# Patient Record
Sex: MALE | Race: WHITE | NOT HISPANIC OR LATINO | Employment: UNEMPLOYED | URBAN - METROPOLITAN AREA
[De-identification: names, ages, dates, MRNs, and addresses within clinical notes are randomized per-mention and may not be internally consistent; named-entity substitution may affect disease eponyms.]

---

## 2020-05-27 ENCOUNTER — TELEPHONE (OUTPATIENT)
Dept: PSYCHIATRY | Facility: CLINIC | Age: 45
End: 2020-05-27

## 2020-05-27 NOTE — TELEPHONE ENCOUNTER
----- Message from Alida Dejesus sent at 5/27/2020  6:19 AM CDT -----  Contact: pt portal request   Appointment Request From: Lamine Pardo    With Provider: Francisco Mendes    Preferred Date Range: 6/10/2020 - 6/12/2020    Preferred Times: Any time    Reason for visit: Counseling    Comments:  COVID-19 pandemic has shook up. Management of PTSD, severe depression, very bad temper is worsening, laid off, overall stress is too toxic

## 2021-04-28 ENCOUNTER — PATIENT MESSAGE (OUTPATIENT)
Dept: RESEARCH | Facility: HOSPITAL | Age: 46
End: 2021-04-28

## 2023-05-05 ENCOUNTER — HOSPITAL ENCOUNTER (EMERGENCY)
Facility: HOSPITAL | Age: 48
Discharge: HOME | End: 2023-05-05
Attending: STUDENT IN AN ORGANIZED HEALTH CARE EDUCATION/TRAINING PROGRAM
Payer: MEDICAID

## 2023-05-05 VITALS
RESPIRATION RATE: 18 BRPM | HEIGHT: 74 IN | OXYGEN SATURATION: 99 % | DIASTOLIC BLOOD PRESSURE: 91 MMHG | WEIGHT: 250 LBS | SYSTOLIC BLOOD PRESSURE: 138 MMHG | HEART RATE: 78 BPM | BODY MASS INDEX: 32.08 KG/M2 | TEMPERATURE: 98 F

## 2023-05-05 DIAGNOSIS — Z86.79 HX OF PRIMARY HYPERTENSION: ICD-10-CM

## 2023-05-05 DIAGNOSIS — E03.9 HYPOTHYROIDISM, UNSPECIFIED TYPE: ICD-10-CM

## 2023-05-05 DIAGNOSIS — Z76.0 MEDICATION REFILL: ICD-10-CM

## 2023-05-05 DIAGNOSIS — F32.A CHRONIC DEPRESSION: ICD-10-CM

## 2023-05-05 DIAGNOSIS — Z13.9 ENCOUNTER FOR SCREENING INVOLVING SOCIAL DETERMINANTS OF HEALTH (SDOH): Primary | ICD-10-CM

## 2023-05-05 PROCEDURE — 99282 EMERGENCY DEPT VISIT SF MDM: CPT

## 2023-05-05 RX ORDER — LEVOTHYROXINE SODIUM 50 UG/1
50 TABLET ORAL DAILY
Qty: 30 TABLET | Refills: 0 | Status: SHIPPED | OUTPATIENT
Start: 2023-05-05

## 2023-05-05 RX ORDER — DULOXETIN HYDROCHLORIDE 60 MG/1
60 CAPSULE, DELAYED RELEASE ORAL DAILY
COMMUNITY

## 2023-05-05 RX ORDER — AMLODIPINE BESYLATE 5 MG/1
5 TABLET ORAL DAILY
Qty: 30 TABLET | Refills: 0 | Status: SHIPPED | OUTPATIENT
Start: 2023-05-05 | End: 2023-06-04

## 2023-05-05 RX ORDER — ACETAMINOPHEN 325 MG/1
650 TABLET ORAL
Status: DISCONTINUED | OUTPATIENT
Start: 2023-05-05 | End: 2023-05-05 | Stop reason: HOSPADM

## 2023-05-05 NOTE — ED PROVIDER NOTES
"Encounter Date: 5/5/2023       History     Chief Complaint   Patient presents with    Psychiatric Evaluation     Pt to er c/o having headache and has been out of his anit-depression meds. Pt repeatly says its "too much". Pt denies S.I or H.I.     HPI    48-year-old male with a past medical history of hypertension, hypothyroidism and depression presents emergency department for wanting to restart his medications.  Patient states he has not had his medications for last few years.  States that he got divorce with his wife and then started taking drugs.  Currently living in sober living.  States that he wants to get back on his medications.  Denies any SI or HI.  States she just wants some referrals and refills.    Review of patient's allergies indicates:  No Known Allergies  Past Medical History:   Diagnosis Date    Diabetes mellitus     Thyroid disease      No past surgical history on file.  No family history on file.  Social History     Tobacco Use    Smoking status: Every Day     Packs/day: 1.00     Types: Cigarettes    Smokeless tobacco: Never   Substance Use Topics    Alcohol use: No    Drug use: No     Review of Systems   Constitutional:  Negative for fever.   Respiratory:  Negative for cough and shortness of breath.    Cardiovascular:  Negative for chest pain.   Gastrointestinal:  Negative for abdominal pain.   Psychiatric/Behavioral:  Positive for dysphoric mood and sleep disturbance. Negative for hallucinations and suicidal ideas.    All other systems reviewed and are negative.    Physical Exam     Initial Vitals [05/05/23 1206]   BP Pulse Resp Temp SpO2   (!) 144/100 83 20 97.7 °F (36.5 °C) 98 %      MAP       --         Physical Exam    Nursing note and vitals reviewed.  Constitutional: He appears well-developed and well-nourished. No distress.   Cardiovascular:  Normal rate and regular rhythm.           Pulmonary/Chest: Breath sounds normal. No respiratory distress.   Abdominal: Abdomen is soft. There is no " abdominal tenderness.   Musculoskeletal:         General: No tenderness. Normal range of motion.     Neurological: He is alert and oriented to person, place, and time.   Skin: Skin is warm. Capillary refill takes less than 2 seconds.   Psychiatric: His speech is normal and behavior is normal. Judgment and thought content normal. He exhibits a depressed mood.       ED Course   Procedures  Labs Reviewed - No data to display       Imaging Results    None          Medications - No data to display  Medical Decision Making:   Differential Diagnosis:   Chronic depression, hypertension, hypothyroidism, social determininants of health, medication refill  ED Management:  Patient is simply here because he does not have any PCP.  States he tried to make an appointment with a doctor but was unable to find 1.  States he just wants to get back on his medication.  Want to find out resources for mental health.  Will give him a sandwich.  I secured him a PCP appointment in a month and he will go [facility] on Monday.  Patient with no SI or HI.   Medical Decision Making  Problems Addressed:  Chronic depression: chronic illness or injury  Encounter for screening involving social determinants of health (SDoH): acute illness or injury  Hx of primary hypertension: chronic illness or injury  Hypothyroidism, unspecified type: chronic illness or injury  Medication refill: self-limited or minor problem    Amount and/or Complexity of Data Reviewed  External Data Reviewed: notes.    Risk  OTC drugs.  Prescription drug management.  Diagnosis or treatment significantly limited by social determinants of health.                           Clinical Impression:   Final diagnoses:  [Z13.9] Encounter for screening involving social determinants of health (SDoH) (Primary)  [F32.A] Chronic depression  [Z86.79] Hx of primary hypertension  [E03.9] Hypothyroidism, unspecified type  [Z76.0] Medication refill        ED Disposition Condition     Discharge Stable          ED Prescriptions       Medication Sig Dispense Start Date End Date Auth. Provider    levothyroxine (SYNTHROID) 50 MCG tablet Take 1 tablet (50 mcg total) by mouth once daily. 30 tablet 5/5/2023 -- Delfino Tristan MD    amLODIPine (NORVASC) 5 MG tablet Take 1 tablet (5 mg total) by mouth once daily. 30 tablet 5/5/2023 6/4/2023 Delfino Tristan MD          Follow-up Information       Follow up With Specialties Details Why Contact Info    Gabriela Abad NP Nurse Practitioner Go on 6/15/2023 at 730 AM [address] [facility] [address]  [phone]      [facility] - Emergency Dept Emergency Medicine Go to  If symptoms worsen [address]  [facility] [id number] [phone]    [facility]  Go to   [address] [facility] [address]  [phone]             Delfino Tristan MD  05/05/23 8386

## 2023-05-05 NOTE — ED TRIAGE NOTES
"Pt to er c/o having headache and has been out of his anit-depression meds. Pt repeatly says its "too much". Pt denies S.I or H.I.  "

## 2025-04-26 ENCOUNTER — HOSPITAL ENCOUNTER (EMERGENCY)
Facility: HOSPITAL | Age: 50
Discharge: HOME | End: 2025-04-26
Attending: EMERGENCY MEDICINE

## 2025-04-26 VITALS
OXYGEN SATURATION: 97 % | HEART RATE: 97 BPM | RESPIRATION RATE: 17 BRPM | SYSTOLIC BLOOD PRESSURE: 148 MMHG | DIASTOLIC BLOOD PRESSURE: 86 MMHG | TEMPERATURE: 98 F | HEIGHT: 74 IN | WEIGHT: 218 LBS | BODY MASS INDEX: 27.98 KG/M2
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
## 2025-04-26 PROCEDURE — 99282 EMERGENCY DEPT VISIT SF MDM: CPT

## 2025-04-26 NOTE — DISCHARGE INSTRUCTIONS
No sign of significant injury.      Rest, lie down flat on your back for as much as possible of the rest of today.      Follow-up with a urologist if any residual concerns.

## 2025-04-26 NOTE — ED PROVIDER NOTES
"Encounter Date: 4/26/2025       History     Chief Complaint   Patient presents with    foreign object on penis     States "I have a sex toy stuck on my penis".  States penile and testicular swelling as a result of ring being on penis since midnight.  Went to [employer] this am and purchased bolt cutters in an attempt to remove self.       Metallic ring being used as a sex toy around his penile shaft and scrotum stuck in place for the last several hours, unable to remove, starting to hurt.  No other injury or complaint.  Nursing staff begin the process of carefully cutting the ring in 2 locations for removal shortly after arrival using a specially designed and relatively new specific ring cutting power tool.    The history is provided by the patient. No  was used.     Review of patient's allergies indicates:  No Known Allergies  Past Medical History:   Diagnosis Date    Diabetes mellitus     Thyroid disease      History reviewed. No pertinent surgical history.  No family history on file.  Social History[1]  Review of Systems   Genitourinary:         See HPI       Physical Exam     Initial Vitals [04/26/25 0659]   BP Pulse Resp Temp SpO2   (!) 150/108 97 17 98.2 °F (36.8 °C) 99 %      MAP       --         Physical Exam    Nursing note and vitals reviewed.  Constitutional: He appears well-developed and well-nourished. He appears distressed.   Pulmonary/Chest: No respiratory distress.   Genitourinary:    Genitourinary Comments: Metallic ring in place circling the base of the scrotum and phallus, approximately 3 8th of an inch in diameter, smooth, appears to be ordinary steel, responding to initial attempts for cutting for removal.  No maria luisa necrosis of tissues noted.  Circumcised, mild edema distal to the ring as would be expected, nothing severe.       Neurological: He is alert and oriented to person, place, and time.         ED Course   Procedures  Labs Reviewed - No data to display         7:39 AM " Successful complete removal of the ring, all portions of the metallic ring accounted for, examined several minutes after final removal.  Resting comfortably, normal perfusion throughout, minimal residual edema of the proximal aspect of the phallus and scrotum, very minimal contusion noted about the base of the scrotum, no sign of injury to Internal scrotal contents, no sign of vascular or structural injury.  Counseled, reassured.  No further specific treatment needed, ready for discharge with the following instructions:      No sign of significant injury.      Rest, lie down flat on your back for as much as possible of the rest of today.      Follow-up with a urologist if any residual concerns.          Imaging Results    None          Medications - No data to display  Medical Decision Making  Unable to remove a metallic ring around the base of the penis and scrotum, successful removal with specifically designed powered ring  the ER with excellent results, no sign residual entry or tissue compromise.    Problems Addressed:  External constriction caused by other object: acute illness or injury      Additional MDM:   Differential Diagnosis:   Extrinsic compression causing edema, extrinsic compression causing vascular compromise, other injury to the genital structures                                    Clinical Impression:  Final diagnoses:  [W49.09XA] External constriction caused by other object (Primary)          ED Disposition Condition    Discharge Good          ED Prescriptions    None       Follow-up Information       Follow up With Specialties Details Why Contact Info    [facility] - Emergency Dept Emergency Medicine  As needed [address] [facility] [address] [phone]    Gabriela Abad, NP Family Medicine  As needed [address] [facility] [address] [phone]                 [1]   Social History  Tobacco Use    Smoking status: Every Day     Current  packs/day: 1.00     Types: Cigarettes    Smokeless tobacco: Never   Substance Use Topics    Alcohol use: No    Drug use: No        Jerson Knutson MD  04/26/25 0748

## 2025-04-26 NOTE — Clinical Note
"Lamine Pettitluz maria Pardo was seen and treated in our emergency department on 4/26/2025.  He may return to work on 04/27/2025.       If you have any questions or concerns, please don't hesitate to call.      Jerson Knutson MD"